# Patient Record
Sex: MALE | URBAN - METROPOLITAN AREA
[De-identification: names, ages, dates, MRNs, and addresses within clinical notes are randomized per-mention and may not be internally consistent; named-entity substitution may affect disease eponyms.]

---

## 2021-02-08 ENCOUNTER — OFFICE (OUTPATIENT)
Dept: URBAN - METROPOLITAN AREA CLINIC 75 | Facility: CLINIC | Age: 76
End: 2021-02-08
Payer: OTHER GOVERNMENT

## 2021-02-08 VITALS — TEMPERATURE: 96 F | HEIGHT: 70 IN | WEIGHT: 203 LBS | RESPIRATION RATE: 16 BRPM

## 2021-02-08 DIAGNOSIS — Z86.010 PERSONAL HISTORY OF COLONIC POLYPS: ICD-10-CM

## 2021-02-08 DIAGNOSIS — R05 COUGH: ICD-10-CM

## 2021-02-08 DIAGNOSIS — K30 FUNCTIONAL DYSPEPSIA: ICD-10-CM

## 2021-02-08 DIAGNOSIS — K21.9 GASTRO-ESOPHAGEAL REFLUX DISEASE WITHOUT ESOPHAGITIS: ICD-10-CM

## 2021-02-08 PROCEDURE — 99204 OFFICE O/P NEW MOD 45 MIN: CPT | Performed by: INTERNAL MEDICINE

## 2021-02-08 NOTE — SERVICEHPINOTES
thank you very much for referring   For evaluation.  As you know he is a pleasant 75-year-old gentleman who does have a history of polyps.  I reviewed his records and he had a colonoscopy done in January of last year.  He has no lower GI complaints.  He does not need lower GI workup or testing at this time.He is having continued reflux.  He is having breakthrough symptoms, he'll have cough or sneezing after meals.  He has some dyspeptic symptoms and satiety.  It sounds like he had a gastric emptying study based on his description which was negative.  It also sounds like he had [facility] and is still having reflux in spite of surgery.  He says he eats food like eggs he'll belch them and they come back up and he can taste and hours later.  There is no dysphagia, odynophagia melena or hematemesis.  He denies chest pain.  There is no weight loss.  He is not a smoker or drinker.  He is in no distress.  He does not look acutely ill.

## 2021-02-08 NOTE — SERVICENOTES
records are reviewed.  He had a colonoscopy in January of last year done at [facility].  His last upper endoscopy was in December 2018.  This was prior to his fundoplication. hemoglobin 12.3.  Hepatic function panel within normal limits.

## 2024-11-02 ENCOUNTER — APPOINTMENT (OUTPATIENT)
Dept: MRI IMAGING | Facility: HOSPITAL | Age: 79
End: 2024-11-02
Payer: OTHER GOVERNMENT

## 2024-11-02 ENCOUNTER — HOSPITAL ENCOUNTER (OUTPATIENT)
Facility: HOSPITAL | Age: 79
Setting detail: OBSERVATION
Discharge: HOME | End: 2024-11-03
Attending: EMERGENCY MEDICINE | Admitting: EMERGENCY MEDICINE
Payer: OTHER GOVERNMENT

## 2024-11-02 ENCOUNTER — APPOINTMENT (OUTPATIENT)
Dept: CT IMAGING | Facility: HOSPITAL | Age: 79
End: 2024-11-02
Payer: OTHER GOVERNMENT

## 2024-11-02 DIAGNOSIS — E11.65 HYPERGLYCEMIA DUE TO DIABETES MELLITUS: ICD-10-CM

## 2024-11-02 DIAGNOSIS — R94.31 PROLONGED Q-T INTERVAL ON ECG: ICD-10-CM

## 2024-11-02 DIAGNOSIS — R42 VERTIGO: Primary | ICD-10-CM

## 2024-11-02 LAB
ALBUMIN SERPL-MCNC: 3.7 G/DL (ref 3.5–5.2)
ALBUMIN/GLOB SERPL: 1.4 G/DL
ALP SERPL-CCNC: 86 U/L (ref 39–117)
ALT SERPL W P-5'-P-CCNC: 9 U/L (ref 1–41)
ANION GAP SERPL CALCULATED.3IONS-SCNC: 9 MMOL/L (ref 5–15)
APTT PPP: 25.6 SECONDS (ref 22.7–35.4)
AST SERPL-CCNC: 8 U/L (ref 1–40)
BASOPHILS # BLD AUTO: 0.07 10*3/MM3 (ref 0–0.2)
BASOPHILS NFR BLD AUTO: 0.9 % (ref 0–1.5)
BILIRUB SERPL-MCNC: 0.5 MG/DL (ref 0–1.2)
BUN SERPL-MCNC: 9 MG/DL (ref 8–23)
BUN/CREAT SERPL: 7.5 (ref 7–25)
CALCIUM SPEC-SCNC: 9.1 MG/DL (ref 8.6–10.5)
CHLORIDE SERPL-SCNC: 98 MMOL/L (ref 98–107)
CO2 SERPL-SCNC: 27 MMOL/L (ref 22–29)
CREAT SERPL-MCNC: 1.2 MG/DL (ref 0.76–1.27)
DEPRECATED RDW RBC AUTO: 39.4 FL (ref 37–54)
EGFRCR SERPLBLD CKD-EPI 2021: 62.3 ML/MIN/1.73
EOSINOPHIL # BLD AUTO: 0.41 10*3/MM3 (ref 0–0.4)
EOSINOPHIL NFR BLD AUTO: 5.2 % (ref 0.3–6.2)
ERYTHROCYTE [DISTWIDTH] IN BLOOD BY AUTOMATED COUNT: 12.7 % (ref 12.3–15.4)
GLOBULIN UR ELPH-MCNC: 2.7 GM/DL
GLUCOSE BLDC GLUCOMTR-MCNC: 183 MG/DL (ref 70–130)
GLUCOSE BLDC GLUCOMTR-MCNC: 214 MG/DL (ref 70–130)
GLUCOSE BLDC GLUCOMTR-MCNC: 367 MG/DL (ref 70–130)
GLUCOSE SERPL-MCNC: 412 MG/DL (ref 65–99)
HBA1C MFR BLD: 10.6 % (ref 4.8–5.6)
HCT VFR BLD AUTO: 43.5 % (ref 37.5–51)
HGB BLD-MCNC: 15.2 G/DL (ref 13–17.7)
IMM GRANULOCYTES # BLD AUTO: 0.02 10*3/MM3 (ref 0–0.05)
IMM GRANULOCYTES NFR BLD AUTO: 0.3 % (ref 0–0.5)
INR PPP: 0.99 (ref 0.9–1.1)
LYMPHOCYTES # BLD AUTO: 1.23 10*3/MM3 (ref 0.7–3.1)
LYMPHOCYTES NFR BLD AUTO: 15.7 % (ref 19.6–45.3)
MAGNESIUM SERPL-MCNC: 2.1 MG/DL (ref 1.6–2.4)
MCH RBC QN AUTO: 30.3 PG (ref 26.6–33)
MCHC RBC AUTO-ENTMCNC: 34.9 G/DL (ref 31.5–35.7)
MCV RBC AUTO: 86.7 FL (ref 79–97)
MONOCYTES # BLD AUTO: 0.51 10*3/MM3 (ref 0.1–0.9)
MONOCYTES NFR BLD AUTO: 6.5 % (ref 5–12)
NEUTROPHILS NFR BLD AUTO: 5.58 10*3/MM3 (ref 1.7–7)
NEUTROPHILS NFR BLD AUTO: 71.4 % (ref 42.7–76)
NRBC BLD AUTO-RTO: 0 /100 WBC (ref 0–0.2)
PLATELET # BLD AUTO: 249 10*3/MM3 (ref 140–450)
PMV BLD AUTO: 10.2 FL (ref 6–12)
POTASSIUM SERPL-SCNC: 3.6 MMOL/L (ref 3.5–5.2)
PROT SERPL-MCNC: 6.4 G/DL (ref 6–8.5)
PROTHROMBIN TIME: 13.3 SECONDS (ref 11.7–14.2)
RBC # BLD AUTO: 5.02 10*6/MM3 (ref 4.14–5.8)
SODIUM SERPL-SCNC: 134 MMOL/L (ref 136–145)
TROPONIN T SERPL HS-MCNC: 27 NG/L
WBC NRBC COR # BLD AUTO: 7.82 10*3/MM3 (ref 3.4–10.8)

## 2024-11-02 PROCEDURE — 63710000001 INSULIN REGULAR HUMAN PER 5 UNITS: Performed by: EMERGENCY MEDICINE

## 2024-11-02 PROCEDURE — G0378 HOSPITAL OBSERVATION PER HR: HCPCS

## 2024-11-02 PROCEDURE — 83735 ASSAY OF MAGNESIUM: CPT | Performed by: EMERGENCY MEDICINE

## 2024-11-02 PROCEDURE — 84484 ASSAY OF TROPONIN QUANT: CPT | Performed by: EMERGENCY MEDICINE

## 2024-11-02 PROCEDURE — 93005 ELECTROCARDIOGRAM TRACING: CPT | Performed by: EMERGENCY MEDICINE

## 2024-11-02 PROCEDURE — 85610 PROTHROMBIN TIME: CPT | Performed by: EMERGENCY MEDICINE

## 2024-11-02 PROCEDURE — 85730 THROMBOPLASTIN TIME PARTIAL: CPT | Performed by: EMERGENCY MEDICINE

## 2024-11-02 PROCEDURE — 70496 CT ANGIOGRAPHY HEAD: CPT

## 2024-11-02 PROCEDURE — 25510000001 IOPAMIDOL PER 1 ML: Performed by: EMERGENCY MEDICINE

## 2024-11-02 PROCEDURE — 99285 EMERGENCY DEPT VISIT HI MDM: CPT

## 2024-11-02 PROCEDURE — 82948 REAGENT STRIP/BLOOD GLUCOSE: CPT

## 2024-11-02 PROCEDURE — 83036 HEMOGLOBIN GLYCOSYLATED A1C: CPT

## 2024-11-02 PROCEDURE — 70498 CT ANGIOGRAPHY NECK: CPT

## 2024-11-02 PROCEDURE — 80053 COMPREHEN METABOLIC PANEL: CPT | Performed by: EMERGENCY MEDICINE

## 2024-11-02 PROCEDURE — 85025 COMPLETE CBC W/AUTO DIFF WBC: CPT | Performed by: EMERGENCY MEDICINE

## 2024-11-02 PROCEDURE — 63710000001 INSULIN LISPRO (HUMAN) PER 5 UNITS

## 2024-11-02 RX ORDER — MECLIZINE HYDROCHLORIDE 25 MG/1
25 TABLET ORAL 3 TIMES DAILY PRN
Status: DISCONTINUED | OUTPATIENT
Start: 2024-11-02 | End: 2024-11-03 | Stop reason: HOSPADM

## 2024-11-02 RX ORDER — SODIUM CHLORIDE 9 MG/ML
40 INJECTION, SOLUTION INTRAVENOUS AS NEEDED
Status: DISCONTINUED | OUTPATIENT
Start: 2024-11-02 | End: 2024-11-03 | Stop reason: HOSPADM

## 2024-11-02 RX ORDER — HYDROCHLOROTHIAZIDE 25 MG/1
25 TABLET ORAL 2 TIMES DAILY
COMMUNITY

## 2024-11-02 RX ORDER — GABAPENTIN 800 MG/1
800 TABLET ORAL 3 TIMES DAILY
COMMUNITY

## 2024-11-02 RX ORDER — IOPAMIDOL 755 MG/ML
95 INJECTION, SOLUTION INTRAVASCULAR
Status: COMPLETED | OUTPATIENT
Start: 2024-11-02 | End: 2024-11-02

## 2024-11-02 RX ORDER — ONDANSETRON 4 MG/1
4 TABLET, ORALLY DISINTEGRATING ORAL EVERY 6 HOURS PRN
Status: DISCONTINUED | OUTPATIENT
Start: 2024-11-02 | End: 2024-11-03 | Stop reason: HOSPADM

## 2024-11-02 RX ORDER — POTASSIUM CHLORIDE 1.5 G/1.58G
40 POWDER, FOR SOLUTION ORAL DAILY
Status: DISCONTINUED | OUTPATIENT
Start: 2024-11-02 | End: 2024-11-03 | Stop reason: HOSPADM

## 2024-11-02 RX ORDER — EMTRICITABINE AND TENOFOVIR ALAFENAMIDE 200; 25 MG/1; MG/1
TABLET ORAL DAILY
COMMUNITY

## 2024-11-02 RX ORDER — METOPROLOL TARTRATE 50 MG
50 TABLET ORAL 2 TIMES DAILY
COMMUNITY

## 2024-11-02 RX ORDER — CLOBETASOL PROPIONATE 0.5 MG/ML
1 LOTION TOPICAL 2 TIMES DAILY
COMMUNITY

## 2024-11-02 RX ORDER — ONDANSETRON 2 MG/ML
4 INJECTION INTRAMUSCULAR; INTRAVENOUS EVERY 6 HOURS PRN
Status: DISCONTINUED | OUTPATIENT
Start: 2024-11-02 | End: 2024-11-03 | Stop reason: HOSPADM

## 2024-11-02 RX ORDER — FLUTICASONE PROPIONATE 50 MCG
2 SPRAY, SUSPENSION (ML) NASAL DAILY
COMMUNITY

## 2024-11-02 RX ORDER — HYDRALAZINE HYDROCHLORIDE 10 MG/1
25 TABLET, FILM COATED ORAL 2 TIMES DAILY
Status: DISCONTINUED | OUTPATIENT
Start: 2024-11-02 | End: 2024-11-03 | Stop reason: HOSPADM

## 2024-11-02 RX ORDER — AMOXICILLIN 250 MG
2 CAPSULE ORAL 2 TIMES DAILY PRN
Status: DISCONTINUED | OUTPATIENT
Start: 2024-11-02 | End: 2024-11-03 | Stop reason: HOSPADM

## 2024-11-02 RX ORDER — NICOTINE POLACRILEX 4 MG
15 LOZENGE BUCCAL
Status: DISCONTINUED | OUTPATIENT
Start: 2024-11-02 | End: 2024-11-03 | Stop reason: HOSPADM

## 2024-11-02 RX ORDER — DEXTROSE MONOHYDRATE 25 G/50ML
25 INJECTION, SOLUTION INTRAVENOUS
Status: DISCONTINUED | OUTPATIENT
Start: 2024-11-02 | End: 2024-11-03 | Stop reason: HOSPADM

## 2024-11-02 RX ORDER — IBUPROFEN 600 MG/1
1 TABLET ORAL
Status: DISCONTINUED | OUTPATIENT
Start: 2024-11-02 | End: 2024-11-03 | Stop reason: HOSPADM

## 2024-11-02 RX ORDER — POTASSIUM CHLORIDE 750 MG/1
40 TABLET, FILM COATED, EXTENDED RELEASE ORAL EVERY 4 HOURS
Status: DISPENSED | OUTPATIENT
Start: 2024-11-02 | End: 2024-11-03

## 2024-11-02 RX ORDER — GABAPENTIN 400 MG/1
800 CAPSULE ORAL EVERY 8 HOURS SCHEDULED
Status: DISCONTINUED | OUTPATIENT
Start: 2024-11-02 | End: 2024-11-03 | Stop reason: HOSPADM

## 2024-11-02 RX ORDER — ACETAMINOPHEN 325 MG/1
650 TABLET ORAL EVERY 4 HOURS PRN
Status: DISCONTINUED | OUTPATIENT
Start: 2024-11-02 | End: 2024-11-03 | Stop reason: HOSPADM

## 2024-11-02 RX ORDER — BISACODYL 10 MG
10 SUPPOSITORY, RECTAL RECTAL DAILY PRN
Status: DISCONTINUED | OUTPATIENT
Start: 2024-11-02 | End: 2024-11-03 | Stop reason: HOSPADM

## 2024-11-02 RX ORDER — HYDROCHLOROTHIAZIDE 25 MG/1
25 TABLET ORAL 2 TIMES DAILY
Status: DISCONTINUED | OUTPATIENT
Start: 2024-11-02 | End: 2024-11-03 | Stop reason: HOSPADM

## 2024-11-02 RX ORDER — MECLIZINE HYDROCHLORIDE 25 MG/1
25 TABLET ORAL 3 TIMES DAILY PRN
COMMUNITY

## 2024-11-02 RX ORDER — ACETAMINOPHEN 650 MG/1
650 SUPPOSITORY RECTAL EVERY 4 HOURS PRN
Status: DISCONTINUED | OUTPATIENT
Start: 2024-11-02 | End: 2024-11-03 | Stop reason: HOSPADM

## 2024-11-02 RX ORDER — POTASSIUM CHLORIDE 3 G/15ML
40 SOLUTION ORAL 2 TIMES DAILY
COMMUNITY

## 2024-11-02 RX ORDER — INSULIN LISPRO 100 [IU]/ML
2-7 INJECTION, SOLUTION INTRAVENOUS; SUBCUTANEOUS
Status: DISCONTINUED | OUTPATIENT
Start: 2024-11-02 | End: 2024-11-03 | Stop reason: HOSPADM

## 2024-11-02 RX ORDER — HYDRALAZINE HYDROCHLORIDE 25 MG/1
25 TABLET, FILM COATED ORAL 2 TIMES DAILY
COMMUNITY

## 2024-11-02 RX ORDER — POLYETHYLENE GLYCOL 3350 17 G/17G
17 POWDER, FOR SOLUTION ORAL DAILY PRN
Status: DISCONTINUED | OUTPATIENT
Start: 2024-11-02 | End: 2024-11-03 | Stop reason: HOSPADM

## 2024-11-02 RX ORDER — METOPROLOL TARTRATE 50 MG
50 TABLET ORAL 2 TIMES DAILY
Status: DISCONTINUED | OUTPATIENT
Start: 2024-11-02 | End: 2024-11-03 | Stop reason: HOSPADM

## 2024-11-02 RX ORDER — SODIUM CHLORIDE 0.9 % (FLUSH) 0.9 %
10 SYRINGE (ML) INJECTION EVERY 12 HOURS SCHEDULED
Status: DISCONTINUED | OUTPATIENT
Start: 2024-11-02 | End: 2024-11-03 | Stop reason: HOSPADM

## 2024-11-02 RX ORDER — SODIUM CHLORIDE 0.9 % (FLUSH) 0.9 %
10 SYRINGE (ML) INJECTION AS NEEDED
Status: DISCONTINUED | OUTPATIENT
Start: 2024-11-02 | End: 2024-11-03 | Stop reason: HOSPADM

## 2024-11-02 RX ORDER — ACETAMINOPHEN 160 MG/5ML
650 SOLUTION ORAL EVERY 4 HOURS PRN
Status: DISCONTINUED | OUTPATIENT
Start: 2024-11-02 | End: 2024-11-03 | Stop reason: HOSPADM

## 2024-11-02 RX ORDER — NITROGLYCERIN 0.4 MG/1
0.4 TABLET SUBLINGUAL
Status: DISCONTINUED | OUTPATIENT
Start: 2024-11-02 | End: 2024-11-03 | Stop reason: HOSPADM

## 2024-11-02 RX ORDER — BISACODYL 5 MG/1
5 TABLET, DELAYED RELEASE ORAL DAILY PRN
Status: DISCONTINUED | OUTPATIENT
Start: 2024-11-02 | End: 2024-11-03 | Stop reason: HOSPADM

## 2024-11-02 RX ADMIN — GABAPENTIN 800 MG: 400 CAPSULE ORAL at 21:45

## 2024-11-02 RX ADMIN — Medication 10 ML: at 21:57

## 2024-11-02 RX ADMIN — INSULIN HUMAN 7 UNITS: 100 INJECTION, SOLUTION PARENTERAL at 18:22

## 2024-11-02 RX ADMIN — IOPAMIDOL 95 ML: 755 INJECTION, SOLUTION INTRAVENOUS at 18:59

## 2024-11-02 RX ADMIN — INSULIN LISPRO 2 UNITS: 100 INJECTION, SOLUTION INTRAVENOUS; SUBCUTANEOUS at 21:43

## 2024-11-02 RX ADMIN — HYDRALAZINE HYDROCHLORIDE 25 MG: 10 TABLET ORAL at 21:43

## 2024-11-02 RX ADMIN — HYDROCHLOROTHIAZIDE 25 MG: 25 TABLET ORAL at 21:42

## 2024-11-02 RX ADMIN — POTASSIUM CHLORIDE 40 MEQ: 1.5 POWDER, FOR SOLUTION ORAL at 21:42

## 2024-11-02 NOTE — ED NOTES
Patient arrives via [facility] from home for complaints of balance issues since last night. Negative stroke assessment. No headaches or visual disturbances. Patient has a history of vertigo. No nausea or vomiting. Alert and oriented x4.

## 2024-11-02 NOTE — PROGRESS NOTES
Clinical Pharmacy Services: Medication History    Nakul Dutton is a 79 y.o. male presenting to [facility] for No admission diagnoses are documented for this encounter.    He  has no past medical history on file.    Allergies as of 11/02/2024 - Reviewed 11/02/2024   Allergen Reaction Noted    Erythromycin Rash 11/02/2024       Medication information was obtained from: [facility]  Pharmacy and Phone Number:     Prior to Admission Medications       Prescriptions Last Dose Informant Patient Reported? Taking?    clobetasol (CLOBEX) 0.05 % lotion  Pharmacy Yes Yes    Apply 1 Application topically to the appropriate area as directed 2 (Two) Times a Day.    Diclofenac Sodium (VOLTAREN) 1 % gel gel  Pharmacy Yes Yes    Apply 4 g topically to the appropriate area as directed 4 (Four) Times a Day.    Emtricitabine-Tenofovir AF (Descovy) 200-25 MG per tablet  Pharmacy Yes Yes    Take  by mouth Daily.    fluticasone (FLONASE) 50 MCG/ACT nasal spray  Pharmacy Yes Yes    Administer 2 sprays into the nostril(s) as directed by provider Daily.    gabapentin (NEURONTIN) 800 MG tablet  Pharmacy Yes Yes    Take 1 tablet by mouth 3 (Three) Times a Day.    hydrALAZINE (APRESOLINE) 25 MG tablet  Pharmacy Yes Yes    Take 1 tablet by mouth 2 (Two) Times a Day.    hydroCHLOROthiazide 25 MG tablet  Pharmacy Yes Yes    Take 1 tablet by mouth 2 (Two) Times a Day.    insulin glargine (LANTUS, SEMGLEE) 100 UNIT/ML injection  Pharmacy Yes Yes    Inject 57 Units under the skin into the appropriate area as directed Every Morning.    meclizine (ANTIVERT) 25 MG tablet  Pharmacy Yes Yes    Take 1 tablet by mouth 3 (Three) Times a Day As Needed for Dizziness.    metoprolol tartrate (LOPRESSOR) 50 MG tablet  Pharmacy Yes Yes    Take 1 tablet by mouth 2 (Two) Times a Day.    Potassium Chloride 40 MEQ/15ML (20%) solution  Pharmacy Yes Yes    Take 15 mL by mouth 2 (Two) Times a Day. Last refill Nov 2023 but still active script per  [facility]              Medication notes: [facility] at [facility] for medication list. She reports pt has not filled potassium chloride since 11/2023 but reports the script is still active.     This medication list is complete to the best of my knowledge as of 11/2/2024    Please call if questions.    Neymar Beasley  Pharmacy Intern  Phone [phone]  11/2/2024 19:09 EDT

## 2024-11-02 NOTE — ED NOTES
Nursing report ED to floor  [facility]  79 y.o.  male    HPI :  HPI  Stated Reason for Visit: vertigo, balance issues  History Obtained From: EMS    Chief Complaint  Chief Complaint   Patient presents with    Dizziness       Admitting doctor:   Markel Goff MD    Admitting diagnosis:   The primary encounter diagnosis was Vertigo. Diagnoses of Hyperglycemia due to diabetes mellitus and Prolonged Q-T interval on ECG were also pertinent to this visit.    Code status:   Current Code Status       Date Active Code Status Order ID Comments User Context       11/2/2024 1947 CPR (Attempt to Resuscitate) [id number]  [place] Ramachandran, JAILYN ED        Question Answer    Code Status (Patient has no pulse and is not breathing) CPR (Attempt to Resuscitate)    Medical Interventions (Patient has pulse or is breathing) Full Support                    Allergies:   Erythromycin    Isolation:   No active isolations    Intake and Output  No intake or output data in the 24 hours ending 11/02/24 1954    Weight:       11/02/24  1713   Weight: 84.8 kg (187 lb)       Most recent vitals:   Vitals:    11/02/24 1907 11/02/24 1908 11/02/24 1909 11/02/24 1931   BP:  148/99  151/88   Pulse: (!) 38 (!) 44 60 57   Resp:       Temp:       SpO2: 100% 100% 94% 98%   Weight:       Height:           Active LDAs/IV Access:   Lines, Drains & Airways       Active LDAs       Name Placement date Placement time Site Days    Peripheral IV 11/02/24 1732 Right Antecubital 11/02/24  1732  Antecubital  less than 1                    Labs (abnormal labs have a star):   Labs Reviewed   COMPREHENSIVE METABOLIC PANEL - Abnormal; Notable for the following components:       Result Value    Glucose 412 (*)     Sodium 134 (*)     All other components within normal limits    Narrative:     GFR Normal >60  Chronic Kidney Disease <60  Kidney Failure <15    The GFR formula is only valid for adults with stable renal function between ages 18 and 70.   SINGLE HS TROPONIN T -  Abnormal; Notable for the following components:    HS Troponin T 27 (*)     All other components within normal limits    Narrative:     High Sensitive Troponin T Reference Range:  <14.0 ng/L- Negative Female for AMI  <22.0 ng/L- Negative Male for AMI  >=14 - Abnormal Female indicating possible myocardial injury.  >=22 - Abnormal Male indicating possible myocardial injury.   Clinicians would have to utilize clinical acumen, EKG, Troponin, and serial changes to determine if it is an Acute Myocardial Infarction or myocardial injury due to an underlying chronic condition.        CBC WITH AUTO DIFFERENTIAL - Abnormal; Notable for the following components:    Lymphocyte % 15.7 (*)     Eosinophils, Absolute 0.41 (*)     All other components within normal limits   POCT GLUCOSE FINGERSTICK - Abnormal; Notable for the following components:    Glucose 367 (*)     All other components within normal limits   POCT GLUCOSE FINGERSTICK - Abnormal; Notable for the following components:    Glucose 214 (*)     All other components within normal limits   PROTIME-INR - Normal   APTT - Normal   MAGNESIUM - Normal   HEMOGLOBIN A1C   POCT GLUCOSE FINGERSTICK   POCT GLUCOSE FINGERSTICK   POCT GLUCOSE FINGERSTICK   POCT GLUCOSE FINGERSTICK   CBC AND DIFFERENTIAL    Narrative:     The following orders were created for panel order CBC & Differential.  Procedure                               Abnormality         Status                     ---------                               -----------         ------                     CBC Auto Differential[[id number]]        Abnormal            Final result                 Please view results for these tests on the individual orders.       EKG:   ECG 12 Lead Other; Dizziness   Preliminary Result   HEART RATE=58  bpm   RR Fgudjzjn=0080  ms   MI Zpbkjysc=658  ms   P Horizontal Axis=  deg   P Front Axis=-11  deg   QRSD Fzyfysvb=206  ms   QT Txqxcolz=460  ms   GGaV=142  ms   QRS Axis=-59  deg   T Wave Axis=-14   deg   - ABNORMAL ECG -   Sinus rhythm   Probable left atrial enlargement   Right bundle branch block   LVH with IVCD and secondary repol abnrm   Prolonged QT interval   Date and Time of Study:2024-11-02 17:31:39          Meds given in ED:   Medications   sodium chloride 0.9 % flush 10 mL (has no administration in time range)   sodium chloride 0.9 % flush 10 mL (has no administration in time range)   sodium chloride 0.9 % flush 10 mL (has no administration in time range)   sodium chloride 0.9 % infusion 40 mL (has no administration in time range)   ondansetron ODT (ZOFRAN-ODT) disintegrating tablet 4 mg (has no administration in time range)     Or   ondansetron (ZOFRAN) injection 4 mg (has no administration in time range)   nitroglycerin (NITROSTAT) SL tablet 0.4 mg (has no administration in time range)   Potassium Replacement - Follow Nurse / BPA Driven Protocol (has no administration in time range)   Magnesium Standard Dose Replacement - Follow Nurse / BPA Driven Protocol (has no administration in time range)   Phosphorus Replacement - Follow Nurse / BPA Driven Protocol (has no administration in time range)   Calcium Replacement - Follow Nurse / BPA Driven Protocol (has no administration in time range)   acetaminophen (TYLENOL) tablet 650 mg (has no administration in time range)     Or   acetaminophen (TYLENOL) 160 MG/5ML oral solution 650 mg (has no administration in time range)     Or   acetaminophen (TYLENOL) suppository 650 mg (has no administration in time range)   sennosides-docusate (PERICOLACE) 8.6-50 MG per tablet 2 tablet (has no administration in time range)     And   polyethylene glycol (MIRALAX) packet 17 g (has no administration in time range)     And   bisacodyl (DULCOLAX) EC tablet 5 mg (has no administration in time range)     And   bisacodyl (DULCOLAX) suppository 10 mg (has no administration in time range)   dextrose (GLUTOSE) oral gel 15 g (has no administration in time range)   dextrose (D50W)  (25 g/50 mL) IV injection 25 g (has no administration in time range)   glucagon (GLUCAGEN) injection 1 mg (has no administration in time range)   insulin lispro (HUMALOG/ADMELOG) injection 2-7 Units (has no administration in time range)   Emtricitabine-Tenofovir AF (DESCOVY) 200-25 MG per tablet 1 tablet (has no administration in time range)   gabapentin (NEURONTIN) capsule 800 mg (has no administration in time range)   hydrALAZINE (APRESOLINE) tablet 25 mg (has no administration in time range)   hydroCHLOROthiazide tablet 25 mg (has no administration in time range)   insulin glargine (LANTUS, SEMGLEE) injection 57 Units (has no administration in time range)   meclizine (ANTIVERT) tablet 25 mg (has no administration in time range)   metoprolol tartrate (LOPRESSOR) tablet 50 mg (has no administration in time range)   potassium chloride (KLOR-CON) packet 40 mEq (has no administration in time range)   insulin regular (humuLIN R,novoLIN R) injection 7 Units (7 Units Intravenous Given 11/2/24 1822)   iopamidol (ISOVUE-370) 76 % injection 95 mL (95 mL Intravenous Given 11/2/24 1859)       Imaging results:  CT Angiogram Head    Result Date: 11/2/2024   1.  No acute intracranial hemorrhage. Mild small vessel ischemic disease. If there is clinical concern for acute infarction, this would be better assessed with MRI. 2.  Asymmetric beaded appearance of the cervical right internal carotid artery, including a more focal approximately 0.7 cm outpouching along the anteromedial aspect at the level of C2. Only subtle contour abnormality is identified in the cervical left intracarotid artery. Findings suggest possible fibromuscular dysplasia, although a superimposed saccular aneurysm is difficult to exclude.  This report was finalized on 11/2/2024 7:38 PM by Dr. Yvonne Pop M.D on Workstation: [id number]      CT Angiogram Neck    Result Date: 11/2/2024   1.  No acute intracranial hemorrhage. Mild small vessel ischemic disease.  If there is clinical concern for acute infarction, this would be better assessed with MRI. 2.  Asymmetric beaded appearance of the cervical right internal carotid artery, including a more focal approximately 0.7 cm outpouching along the anteromedial aspect at the level of C2. Only subtle contour abnormality is identified in the cervical left intracarotid artery. Findings suggest possible fibromuscular dysplasia, although a superimposed saccular aneurysm is difficult to exclude.  This report was finalized on 11/2/2024 7:38 PM by Dr. Yvonne Pop M.D on Workstation: [id number]       Ambulatory status:   - up to bedside      Social issues:   Social History     Socioeconomic History    Marital status: Single       Peripheral Neurovascular  Peripheral Neurovascular (Adult)  Peripheral Neurovascular WDL: WDL    Neuro Cognitive  Neuro Cognitive (Adult)  Cognitive/Neuro/Behavioral WDL: .WDL except  NIH Stroke Scale  Interval: baseline  1a. Level of Consciousness: 0-->Alert, keenly responsive  1b. LOC Questions: 0-->Answers both questions correctly  1c. LOC Commands: 0-->Performs both tasks correctly  2. Best Gaze: 0-->Normal  3. Visual: 0-->No visual loss  4. Facial Palsy: 0-->Normal symmetrical movements  5a. Motor Arm, Left: 0-->No drift, limb holds 90 (or 45) degrees for full 10 secs  5b. Motor Arm, Right: 0-->No drift, limb holds 90 (or 45) degrees for full 10 secs  6a. Motor Leg, Left: 0-->No drift, leg holds 30 degree position for full 5 secs  6b. Motor Leg, Right: 0-->No drift, leg holds 30 degree position for full 5 secs  7. Limb Ataxia: 0-->Absent  8. Sensory: 0-->Normal, no sensory loss  9. Best Language: 0-->No aphasia, normal  10. Dysarthria: 0-->Normal  11. Extinction and Inattention (formerly Neglect): 0-->No abnormality  Total (NIH Stroke Scale): 0    Learning  Learning Assessment  Learning Readiness and Ability: no barriers identified    Respiratory  Respiratory WDL  Respiratory WDL: WDL    Abdominal  Pain       Pain Assessments  Pain (Adult)  (0-10) Pain Rating: Rest: 0    NIH Stroke Scale  NIH Stroke Scale  Interval: baseline  1a. Level of Consciousness: 0-->Alert, keenly responsive  1b. LOC Questions: 0-->Answers both questions correctly  1c. LOC Commands: 0-->Performs both tasks correctly  2. Best Gaze: 0-->Normal  3. Visual: 0-->No visual loss  4. Facial Palsy: 0-->Normal symmetrical movements  5a. Motor Arm, Left: 0-->No drift, limb holds 90 (or 45) degrees for full 10 secs  5b. Motor Arm, Right: 0-->No drift, limb holds 90 (or 45) degrees for full 10 secs  6a. Motor Leg, Left: 0-->No drift, leg holds 30 degree position for full 5 secs  6b. Motor Leg, Right: 0-->No drift, leg holds 30 degree position for full 5 secs  7. Limb Ataxia: 0-->Absent  8. Sensory: 0-->Normal, no sensory loss  9. Best Language: 0-->No aphasia, normal  10. Dysarthria: 0-->Normal  11. Extinction and Inattention (formerly Neglect): 0-->No abnormality  Total (NIH Stroke Scale): 0    Daisy Lemus RN  11/02/24 19:54 EDT

## 2024-11-02 NOTE — ED PROVIDER NOTES
" EMERGENCY DEPARTMENT ENCOUNTER  Room Number:  11/11  PCP: Provider, No Known  Independent Historians: Patient and EMS      HPI:  Chief Complaint: had concerns including Dizziness.  Leaning to the right and falling to the right    A complete HPI/ROS/PMH/PSH/SH/FH are unobtainable due to: None    Chronic or social conditions impacting patient care (Social Determinants of Health): None      Context: Nakul Dutton is a 79 y.o. male with a medical history of diabetes and vertigo who presents to the ED c/o acute dizziness with unsteadiness causing him to fall to the right.  Patient says that he was \"leaning to the right all day yesterday\" and then \"began falling to the right all day today.\"  He said he was trying to lean over and feed his dogs this morning and he fell to the right.  He has had persistent imbalance of the day to the point he cannot ambulate safely at home.  He denies any headache.  Denies any vision changes.  Denies any chest pain or difficulties breathing.  He denies any nausea or vomiting.  Denies any fevers or flulike symptoms.  [facility] and has been awaiting an outpatient follow-up with neurolog[facility] in the future.  He additionally tells me that he has been out of his diabetes medications for the past 2 weeks      Review of prior external notes (non-ED) -and- Review of prior external test results outside of this encounter: I independently reviewed the neurosurgery progress note from January 13, 2023.  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      FAMILY HISTORY  No family history on file.      SOCIAL HISTORY  Social History     Socioeconomic History    Marital status: Single         ALLERGIES  Erythromycin      REVIEW OF SYSTEMS  Review of Systems  Included in HPI  All systems reviewed and negative except for those discussed in HPI.      PHYSICAL EXAM    I have reviewed the triage vital signs and nursing notes.    ED Triage Vitals [11/02/24 1713]   Temp Heart Rate Resp BP SpO2   97.4 °F (36.3 °C) 63 18 163/88 98 %      Temp src Heart Rate Source Patient Position BP Location FiO2 (%)   -- -- -- -- --       Physical Exam  GENERAL: alert, no acute distress  SKIN: Warm, dry, no rashes  HENT: Normocephalic, atraumatic  EYES: no scleral icterus, normal conjunctivae  CV: regular rhythm, regular rate  RESPIRATORY: normal effort, lungs clear bilaterally  ABDOMEN: soft, nondistended, nontender  MUSCULOSKELETAL: no deformity  NEURO: alert, moves all extremities, follows commands.  No facial droop.  Speech is clear and coherent.  No focal motor deficits at all.  Normal cerebellar signs.      LAB RESULTS  Recent Results (from the past 24 hours)   POC Glucose Once    Collection Time: 11/02/24  5:22 PM    Specimen: Blood   Result Value Ref Range    Glucose 367 (H) 70 - 130 mg/dL   Comprehensive Metabolic Panel    Collection Time: 11/02/24  5:31 PM    Specimen: Blood   Result Value Ref Range    Glucose 412 (C) 65 - 99 mg/dL    BUN 9 8 - 23 mg/dL    Creatinine 1.20 0.76 - 1.27 mg/dL    Sodium 134 (L) 136 - 145 mmol/L    Potassium 3.6 3.5 - 5.2 mmol/L    Chloride 98 98 - 107 mmol/L    CO2 27.0 22.0 - 29.0 mmol/L    Calcium 9.1 8.6 - 10.5 mg/dL    Total Protein 6.4 6.0 - 8.5 g/dL    Albumin 3.7 3.5 - 5.2 g/dL    ALT (SGPT) 9 1 - 41 U/L    AST (SGOT) 8 1 - 40 U/L    Alkaline Phosphatase 86 39 - 117 U/L    Total  Bilirubin 0.5 0.0 - 1.2 mg/dL    Globulin 2.7 gm/dL    A/G Ratio 1.4 g/dL    BUN/Creatinine Ratio 7.5 7.0 - 25.0    Anion Gap 9.0 5.0 - 15.0 mmol/L    eGFR 62.3 >60.0 mL/min/1.73   Protime-INR    Collection Time: 11/02/24  5:31 PM    Specimen: Blood   Result Value Ref Range    Protime 13.3 11.7 - 14.2 Seconds    INR 0.99 0.90 - 1.10   aPTT    Collection Time: 11/02/24  5:31 PM    Specimen: Blood   Result Value Ref Range    PTT 25.6 22.7 - 35.4 seconds   Single High Sensitivity Troponin T    Collection Time: 11/02/24  5:31 PM    Specimen: Blood   Result Value Ref Range    HS Troponin T 27 (H) <22 ng/L   CBC Auto Differential    Collection Time: 11/02/24  5:31 PM    Specimen: Blood   Result Value Ref Range    WBC 7.82 3.40 - 10.80 10*3/mm3    RBC 5.02 4.14 - 5.80 10*6/mm3    Hemoglobin 15.2 13.0 - 17.7 g/dL    Hematocrit 43.5 37.5 - 51.0 %    MCV 86.7 79.0 - 97.0 fL    MCH 30.3 26.6 - 33.0 pg    MCHC 34.9 31.5 - 35.7 g/dL    RDW 12.7 12.3 - 15.4 %    RDW-SD 39.4 37.0 - 54.0 fl    MPV 10.2 6.0 - 12.0 fL    Platelets 249 140 - 450 10*3/mm3    Neutrophil % 71.4 42.7 - 76.0 %    Lymphocyte % 15.7 (L) 19.6 - 45.3 %    Monocyte % 6.5 5.0 - 12.0 %    Eosinophil % 5.2 0.3 - 6.2 %    Basophil % 0.9 0.0 - 1.5 %    Immature Grans % 0.3 0.0 - 0.5 %    Neutrophils, Absolute 5.58 1.70 - 7.00 10*3/mm3    Lymphocytes, Absolute 1.23 0.70 - 3.10 10*3/mm3    Monocytes, Absolute 0.51 0.10 - 0.90 10*3/mm3    Eosinophils, Absolute 0.41 (H) 0.00 - 0.40 10*3/mm3    Basophils, Absolute 0.07 0.00 - 0.20 10*3/mm3    Immature Grans, Absolute 0.02 0.00 - 0.05 10*3/mm3    nRBC 0.0 0.0 - 0.2 /100 WBC   Magnesium    Collection Time: 11/02/24  5:31 PM    Specimen: Blood   Result Value Ref Range    Magnesium 2.1 1.6 - 2.4 mg/dL   ECG 12 Lead Other; Dizziness    Collection Time: 11/02/24  5:31 PM   Result Value Ref Range    QT Interval 547 ms    QTC Interval 538 ms   POC Glucose Once    Collection Time: 11/02/24  7:13 PM    Specimen: Blood   Result  Value Ref Range    Glucose 214 (H) 70 - 130 mg/dL         RADIOLOGY  CT Angiogram Head, CT Angiogram Neck    Result Date: 11/2/2024  PROCEDURE: CT ANGIOGRAM HEAD-, CT ANGIOGRAM NECK-  HISTORY: Dizziness, unsteadiness.  TECHNIQUE: CT images of the brain were obtained without intravenous contrast. Following the administration of intravenous contrast, CT angiography images of the head and neck were obtained. Reformatted and 3-D images were reviewed. Radiation dose reduction techniques were utilized, including automated exposure control and exposure modulation based on body size.  COMPARISON:  None.  FINDINGS:  Non Contrast CT Brain:  There is moderate to advanced global brain volume loss.  No acute intracranial hemorrhage or pathologic extra-axial collection is identified.  There is no midline shift or mass effect.  The basal cisterns are patent. There is mild small vessel ischemic disease. The grey-white matter differentiation is maintained. There is calcific intracranial atherosclerosis. There is trace mucosal thickening of the right maxillary sinus and trace fluid within the right mastoid air cells.  CT Angiography Head:  The right ophthalmic artery appears to arise from the cavernous segment of the right ICA, a developmental variant. The visible internal carotid arteries, middle and anterior cerebral arteries demonstrate otherwise normal contrast-related enhancement.  The anterior communicating artery complex has a normal appearance. No sizable posterior communicating arteries are identified on either side.  The left vertebral artery is dominant. The basilar artery is tortuous. The vertebrobasilar circulation and both posterior cerebral arteries demonstrate otherwise normal contrast-related enhancement. Bilateral PICA origins are visualized.  No hemodynamically significant stenosis is seen.  CT Angiography Neck:  There is a 3 vessel aortic arch.  The origins of the vessels arising from the arch are patent. The  proximal common carotid arteries are slightly tortuous. There is minimal calcific atherosclerosis at the left carotid bulb. There is no significant carotid bulb narrowing by NASCET criteria. There is a beaded appearance of the right internal carotid artery. There is a more focal approximately 0.7 cm outpouching along the anteromedial aspect of the right internal carotid artery at the level of C2. There is only subtle contour abnormality of the left internal carotid artery.  The vertebral arteries arise from the subclavian arteries.  The left vertebral artery is dominant.  There is no evidence of flow-limiting stenosis or occlusion of the vertebral arteries.  There are incompletely assessed thyroid nodules measuring up to 1.1 cm on the left. There is degenerative disc disease.        1.  No acute intracranial hemorrhage. Mild small vessel ischemic disease. If there is clinical concern for acute infarction, this would be better assessed with MRI. 2.  Asymmetric beaded appearance of the cervical right internal carotid artery, including a more focal approximately 0.7 cm outpouching along the anteromedial aspect at the level of C2. Only subtle contour abnormality is identified in the cervical left intracarotid artery. Findings suggest possible fibromuscular dysplasia, although a superimposed saccular aneurysm is difficult to exclude.  This report was finalized on 11/2/2024 7:38 PM by Dr. Yvonne Pop M.D on Workstation: [id number]         MEDICATIONS GIVEN IN ER  Medications   sodium chloride 0.9 % flush 10 mL (has no administration in time range)   sodium chloride 0.9 % flush 10 mL (has no administration in time range)   sodium chloride 0.9 % flush 10 mL (has no administration in time range)   sodium chloride 0.9 % infusion 40 mL (has no administration in time range)   ondansetron ODT (ZOFRAN-ODT) disintegrating tablet 4 mg (has no administration in time range)     Or   ondansetron (ZOFRAN) injection 4 mg (has no  administration in time range)   nitroglycerin (NITROSTAT) SL tablet 0.4 mg (has no administration in time range)   Potassium Replacement - Follow Nurse / BPA Driven Protocol (has no administration in time range)   Magnesium Standard Dose Replacement - Follow Nurse / BPA Driven Protocol (has no administration in time range)   Phosphorus Replacement - Follow Nurse / BPA Driven Protocol (has no administration in time range)   Calcium Replacement - Follow Nurse / BPA Driven Protocol (has no administration in time range)   acetaminophen (TYLENOL) tablet 650 mg (has no administration in time range)     Or   acetaminophen (TYLENOL) 160 MG/5ML oral solution 650 mg (has no administration in time range)     Or   acetaminophen (TYLENOL) suppository 650 mg (has no administration in time range)   sennosides-docusate (PERICOLACE) 8.6-50 MG per tablet 2 tablet (has no administration in time range)     And   polyethylene glycol (MIRALAX) packet 17 g (has no administration in time range)     And   bisacodyl (DULCOLAX) EC tablet 5 mg (has no administration in time range)     And   bisacodyl (DULCOLAX) suppository 10 mg (has no administration in time range)   insulin regular (humuLIN R,novoLIN R) injection 7 Units (7 Units Intravenous Given 11/2/24 1822)   iopamidol (ISOVUE-370) 76 % injection 95 mL (95 mL Intravenous Given 11/2/24 1859)         ORDERS PLACED DURING THIS VISIT:  Orders Placed This Encounter   Procedures    CT Angiogram Head    CT Angiogram Neck    Comprehensive Metabolic Panel    Protime-INR    aPTT    Single High Sensitivity Troponin T    CBC Auto Differential    Magnesium    CBC (No Diff)    Basic Metabolic Panel    Diet: Cardiac, Diabetic; Healthy Heart (2-3 Na+); Consistent Carbohydrate; Fluid Consistency: Thin (IDDSI 0)    Monitor Blood Pressure    Intake & Output    Weigh Patient    Oral Care    Place Sequential Compression Device    Maintain Sequential Compression Device    Maintain IV Access    Telemetry -  Place Orders & Notify Provider of Results When Patient Experiences Acute Chest Pain, Dysrhythmia or Respiratory Distress    May Be Off Telemetry for Tests    Vital Signs    Continuous Pulse Oximetry    Up With Assistance    Neuro Checks    Code Status and Medical Interventions: CPR (Attempt to Resuscitate); Full Support    Inpatient Neurology Consult Stroke    POC Glucose Once    POC Glucose Once    ECG 12 Lead Other; Dizziness    Insert Peripheral IV    Insert Peripheral IV    Initiate ED Observation Status    CBC & Differential         OUTPATIENT MEDICATION MANAGEMENT:  Current Facility-Administered Medications Ordered in Epic   Medication Dose Route Frequency Provider Last Rate Last Admin    acetaminophen (TYLENOL) tablet 650 mg  650 mg Oral Q4H PRN Trinidad Ramachandran APRN        Or    acetaminophen (TYLENOL) 160 MG/5ML oral solution 650 mg  650 mg Oral Q4H PRN Trinidad Ramachandran APRN        Or    acetaminophen (TYLENOL) suppository 650 mg  650 mg Rectal Q4H PRN Trinidad Ramachandran APRN        sennosides-docusate (PERICOLACE) 8.6-50 MG per tablet 2 tablet  2 tablet Oral BID PRN Trinidad Ramachandran APRN        And    polyethylene glycol (MIRALAX) packet 17 g  17 g Oral Daily PRN Trinidad Ramachandran APRN        And    bisacodyl (DULCOLAX) EC tablet 5 mg  5 mg Oral Daily PRN Trinidad Ramachandran APRN        And    bisacodyl (DULCOLAX) suppository 10 mg  10 mg Rectal Daily PRN Trinidad Ramachandran APRN        Calcium Replacement - Follow Nurse / BPA Driven Protocol   Does not apply PRN rTinidad Ramachandran APRN        Magnesium Standard Dose Replacement - Follow Nurse / BPA Driven Protocol   Does not apply PRN Trinidad Ramachandran APRN        nitroglycerin (NITROSTAT) SL tablet 0.4 mg  0.4 mg Sublingual Q5 Min PRN Trinidad Ramachandran APRN        ondansetron ODT (ZOFRAN-ODT) disintegrating tablet 4 mg  4 mg Oral Q6H PRN Trinidad Ramachandran APRN        Or    ondansetron (ZOFRAN) injection 4 mg  4 mg Intravenous Q6H PRN Trinidad Ramachandran APRN        Phosphorus  Replacement - Follow Nurse / BPA Driven Protocol   Does not apply PRN DuarteArceliahy S, APRN        Potassium Replacement - Follow Nurse / BPA Driven Protocol   Does not apply PRN DuarteArceliahy S, APRN        sodium chloride 0.9 % flush 10 mL  10 mL Intravenous PRN Francisco Pandey MD        sodium chloride 0.9 % flush 10 mL  10 mL Intravenous Q12H Duarte, Trinidad S, APRN        sodium chloride 0.9 % flush 10 mL  10 mL Intravenous PRN DuarteArceliahy S, APRN        sodium chloride 0.9 % infusion 40 mL  40 mL Intravenous PRN DuarteArceliahy S, APRN         Current Outpatient Medications Ordered in Epic   Medication Sig Dispense Refill    clobetasol (CLOBEX) 0.05 % lotion Apply 1 Application topically to the appropriate area as directed 2 (Two) Times a Day.      Diclofenac Sodium (VOLTAREN) 1 % gel gel Apply 4 g topically to the appropriate area as directed 4 (Four) Times a Day.      Emtricitabine-Tenofovir AF (Descovy) 200-25 MG per tablet Take  by mouth Daily.      fluticasone (FLONASE) 50 MCG/ACT nasal spray Administer 2 sprays into the nostril(s) as directed by provider Daily.      gabapentin (NEURONTIN) 800 MG tablet Take 1 tablet by mouth 3 (Three) Times a Day.      hydrALAZINE (APRESOLINE) 25 MG tablet Take 1 tablet by mouth 2 (Two) Times a Day.      hydroCHLOROthiazide 25 MG tablet Take 1 tablet by mouth 2 (Two) Times a Day.      insulin glargine (LANTUS, SEMGLEE) 100 UNIT/ML injection Inject 57 Units under the skin into the appropriate area as directed Every Morning.      meclizine (ANTIVERT) 25 MG tablet Take 1 tablet by mouth 3 (Three) Times a Day As Needed for Dizziness.      metoprolol tartrate (LOPRESSOR) 50 MG tablet Take 1 tablet by mouth 2 (Two) Times a Day.      Potassium Chloride 40 MEQ/15ML (20%) solution Take 15 mL by mouth 2 (Two) Times a Day. Last refill Nov 2023 but still active [facility]           PROCEDURES  Procedures        PROGRESS, DATA ANALYSIS, CONSULTS, AND MEDICAL DECISION  MAKING  All labs have been independently interpreted by me.  All radiology studies have been reviewed by me. All EKG's have been independently viewed and interpreted by me.  Discussion below represents my analysis of pertinent findings related to patient's condition, differential diagnosis, treatment plan and final disposition.    Differential diagnosis includes but is not limited to acute stroke, BPPV, dehydration, vestibular neuritis, electrolyte abnormality.    Clinical Scores:            Total (NIH Stroke Scale): 0      ED Course as of 11/02/24 1948   Sat Nov 02, 2024 1739 EKG         EKG time/Interp time: 1731/1733  Rhythm/Rate: Sinus rhythm, 58 bpm  P waves and AL: Present, normal interval  QRS, axis: 151 ms, right bundle branch block, left axis deviation, LVH  ST and T waves: Interpretation is limited because of the bundle branch block but there are no ST segment elevations present.  The QTc is prolonged at 538 ms.  Independently interpreted by me contemporaneously with treatment   [MICHAEL]   1855 Glucose(!!): 412 [MICHAEL]   1855 HS Troponin T(!): 27 [MICHAEL]   1855 Hemoglobin: 15.2 [MICHAEL]   1855 Hematocrit: 43.5 [MICHAEL]   1923 Glucose(!): 214  Glucose level is improved after 1 dose of insulin. [MICHAEL]   1942 I independently interpreted the Head CT w/o Contrast and my findings are: No acute hemorrhage, no midline shift   [MICHAEL]   1945 I discussed with APRNTrinidad, in the observation unit about the patient.  She agrees to accept him for further medical management tonight on behalf of Dr. Goff. [MICHAEL]      ED Course User Index  [MICHAEL] Francisco Pandey MD             AS OF 19:48 EDT VITALS:    BP - 151/88  HR - 57  TEMP - 97.4 °F (36.3 °C)  O2 SATS - 98%    COMPLEXITY OF CARE  The patient requires admission.      DIAGNOSIS  Final diagnoses:   Vertigo   Hyperglycemia due to diabetes mellitus   Prolonged Q-T interval on ECG         DISPOSITION  ED Disposition       ED Disposition   Intended Admit    Condition   --    Comment   --                 Please note that portions of this document were completed with a voice recognition program.    Note Disclaimer: At [facility], we believe that sharing information builds trust and better relationships. You are receiving this note because you recently visited [facility]. It is possible you will see health information before a provider has talked with you about it. This kind of information can be easy to misunderstand. To help you fully understand what it means for your health, we urge you to discuss this note with your provider.         Francisco Pandey MD  11/02/24 1948

## 2024-11-02 NOTE — H&P
"[facility] [place]    Patient Name: Nakul Dutton  : 1945  MRN: [id number]  Primary Care Physician:  Provider, No Known  Date of admission: 2024    Subjective   Subjective     Chief Complaint:   Chief Complaint   Patient presents with   • Dizziness         HPI:    Nakul Dutton is a 79 y.o. male with a past medical history including, but not limited to, hypertension, type 2 diabetes, and HIV, presented to the emergency department with a complaint of a fall secondary to being off balance.  Patient states that for greater than a year now he has a tendency to lean to the right when he walks.  He calls this vertigo however, he denies any dizziness.  Today he was standing in his kitchen cooking when he leaned to the left and fell.  He denies hitting his head.  He states that his\" vertigo\" has been really bad today and therefore he is not able to walk straight.  He denies any visual disturbances, difficulty with speech, numbness, paresthesias, or weakness of his extremities.  Neurology has been consulted to see the patient in the AM.  PT and OT have also been consulted.    Review of Systems   All systems were reviewed and negative except for: What was mentioned above in the HPI.    Personal History     No past medical history on file.    No past surgical history on file.    Family History: family history is not on file. Otherwise pertinent FHx was reviewed and not pertinent to current issue.    Social History:      Home Medications:  Diclofenac Sodium, Emtricitabine-Tenofovir AF, Potassium Chloride, clobetasol, fluticasone, gabapentin, hydrALAZINE, hydroCHLOROthiazide, insulin glargine, meclizine, and metoprolol tartrate    Allergies:  Allergies   Allergen Reactions   • Erythromycin Rash       Objective   Objective     Vitals:   Temp:  [97.4 °F (36.3 °C)] 97.4 °F (36.3 °C)  Heart Rate:  [37-63] 57  Resp:  [18] 18  BP: (148-163)/(88-99) 151/88  Physical Exam   Constitutional: Awake, alert "   Eyes: PERRLA   HENT: NCAT, mucous membranes moist   Neck: Supple   Respiratory: Clear to auscultation bilaterally, nonlabored respirations    Cardiovascular: regular rate, palpable pedal pulses bilaterally   Gastrointestinal: Positive bowel sounds, soft, nontender, nondistended   Musculoskeletal: No bilateral ankle edema   Psychiatric: Appropriate affect, cooperative   Neurologic: Oriented x 3, speech clear   Skin: No rashes       Result Review    Result Review:  I have personally reviewed the results from the time of this admission to 11/2/2024 19:47 EDT and agree with these findings:  [x]  Laboratory list / accordion  []  Microbiology  [x]  Radiology  [x]  EKG/Telemetry   []  Cardiology/Vascular   []  Pathology  [x]  Old records  []  Other:    Initial workup in the emergency department shows high-sensitivity troponin of 27, sodium 134, glucose 1/4/2012, all of the lab work is at baseline for the patient.  EKG shows sinus rhythm, rate of 58.  CT head without contrast shows no acute intercranial hemorrhage.  Mild small vessel ischemic disease.  CTA head and neck shows asymmetric beaded appearance of the cervical right internal carotid artery, including a more focal approximately 0.7 cm outpouching along that anterior medial aspect of the level of C2.  Only subtle contour abnormality is identified in the cervical left intracarotid artery.  Findings suggest possible fibromuscular dysplasia, although a superimposed saccular aneurysm is difficult to exclude.    Assessment & Plan   Assessment / Plan     Brief Patient Summary:  Nakul Dutton is a 79 y.o. male who was admitted to the observation unit for further evaluation and treatment of his dizziness and disequilibrium.    Active Hospital Problems:  Active Hospital Problems    Diagnosis    • **Dizziness      Plan:     Vertigo/disequilibrium  -Neurochecks every 4 hours   -Vital signs every 4 hours   -Cardiac monitoring   -MRI--pending  -CT Head -no acute intracranial  [Dear  ___] : Dear  [unfilled], [Consult Letter:] : I had the pleasure of evaluating your patient, [unfilled]. hemorrhage.  Mild small vessel ischemic disease.  -CTA Head/neck -asymmetric beaded appearance to the cervical right internal carotid artery, including a more focal approximately 0.7 Salemme Demeter outpouching along the anterior medial aspect at the level of C2.  Only subtle contour abnormality is identified in the cervical left internal carotid artery.  Findings suggest a possible fibromuscular dysplasia, although a superimposed saccular aneurysm is difficult to exclude.  -Neuro consult     Diabetes  -Accu-Cheks AC&HS  -Adult subcutaneous insulin management-low dose  -Hemoglobin A1c- pending    Hypertension  -Monitor blood pressure  -Continue home blood pressure agents    HIV  -Continue home dose Emtricitabine-Tenofovir AF     VTE Prophylaxis:  Mechanical VTE prophylaxis orders are present.        CODE STATUS:    Code Status (Patient has no pulse and is not breathing): CPR (Attempt to Resuscitate)  Medical Interventions (Patient has pulse or is breathing): Full Support    Admission Status:  I believe this patient meets observation status.    76 minutes have been spent by [facility] providers in the care of this patient while under observation status.      Appropriate PPE worn during patient encounter.  Hand hygeine performed before and after seeing the patient.      Electronically signed by JAILYN Sotelo, 11/02/24, 7:47 PM EDT.            [Please see my note below.] : Please see my note below. [Consult Closing:] : Thank you very much for allowing me to participate in the care of this patient.  If you have any questions, please do not hesitate to contact me. [Sincerely,] : Sincerely, [FreeTextEntry2] : Anant Dodd MD\par [address]\[place] [address] [FreeTextEntry3] : Tito Blanton MD\par Director, [facility]\par [facility]\par [address]\[place] [address]\par [phone]

## 2024-11-03 ENCOUNTER — APPOINTMENT (OUTPATIENT)
Dept: MRI IMAGING | Facility: HOSPITAL | Age: 79
End: 2024-11-03
Payer: OTHER GOVERNMENT

## 2024-11-03 VITALS
WEIGHT: 167.4 LBS | SYSTOLIC BLOOD PRESSURE: 154 MMHG | OXYGEN SATURATION: 99 % | RESPIRATION RATE: 16 BRPM | TEMPERATURE: 97.6 F | HEIGHT: 70 IN | DIASTOLIC BLOOD PRESSURE: 82 MMHG | BODY MASS INDEX: 23.96 KG/M2 | HEART RATE: 63 BPM

## 2024-11-03 PROBLEM — R27.8 SENSORY ATAXIA: Status: ACTIVE | Noted: 2024-11-03

## 2024-11-03 PROBLEM — I77.3 FIBROMUSCULAR DYSPLASIA OF CAROTID ARTERY: Status: ACTIVE | Noted: 2024-11-03

## 2024-11-03 LAB
ANION GAP SERPL CALCULATED.3IONS-SCNC: 11.8 MMOL/L (ref 5–15)
BUN SERPL-MCNC: 8 MG/DL (ref 8–23)
BUN/CREAT SERPL: 7.3 (ref 7–25)
CALCIUM SPEC-SCNC: 9.5 MG/DL (ref 8.6–10.5)
CHLORIDE SERPL-SCNC: 100 MMOL/L (ref 98–107)
CO2 SERPL-SCNC: 24.2 MMOL/L (ref 22–29)
CREAT SERPL-MCNC: 1.1 MG/DL (ref 0.76–1.27)
DEPRECATED RDW RBC AUTO: 41.1 FL (ref 37–54)
EGFRCR SERPLBLD CKD-EPI 2021: 69.1 ML/MIN/1.73
ERYTHROCYTE [DISTWIDTH] IN BLOOD BY AUTOMATED COUNT: 13.1 % (ref 12.3–15.4)
GLUCOSE BLDC GLUCOMTR-MCNC: 218 MG/DL (ref 70–130)
GLUCOSE BLDC GLUCOMTR-MCNC: 290 MG/DL (ref 70–130)
GLUCOSE SERPL-MCNC: 204 MG/DL (ref 65–99)
HCT VFR BLD AUTO: 43.8 % (ref 37.5–51)
HGB BLD-MCNC: 15.3 G/DL (ref 13–17.7)
MCH RBC QN AUTO: 30.5 PG (ref 26.6–33)
MCHC RBC AUTO-ENTMCNC: 34.9 G/DL (ref 31.5–35.7)
MCV RBC AUTO: 87.3 FL (ref 79–97)
PLATELET # BLD AUTO: 254 10*3/MM3 (ref 140–450)
PMV BLD AUTO: 10.6 FL (ref 6–12)
POTASSIUM SERPL-SCNC: 3.3 MMOL/L (ref 3.5–5.2)
QT INTERVAL: 547 MS
QTC INTERVAL: 538 MS
RBC # BLD AUTO: 5.02 10*6/MM3 (ref 4.14–5.8)
SODIUM SERPL-SCNC: 136 MMOL/L (ref 136–145)
WBC NRBC COR # BLD AUTO: 8.05 10*3/MM3 (ref 3.4–10.8)

## 2024-11-03 PROCEDURE — 82948 REAGENT STRIP/BLOOD GLUCOSE: CPT

## 2024-11-03 PROCEDURE — G0378 HOSPITAL OBSERVATION PER HR: HCPCS

## 2024-11-03 PROCEDURE — 97530 THERAPEUTIC ACTIVITIES: CPT

## 2024-11-03 PROCEDURE — 85027 COMPLETE CBC AUTOMATED: CPT

## 2024-11-03 PROCEDURE — 80048 BASIC METABOLIC PNL TOTAL CA: CPT

## 2024-11-03 PROCEDURE — 70551 MRI BRAIN STEM W/O DYE: CPT

## 2024-11-03 PROCEDURE — 97162 PT EVAL MOD COMPLEX 30 MIN: CPT

## 2024-11-03 PROCEDURE — 99204 OFFICE O/P NEW MOD 45 MIN: CPT | Performed by: PSYCHIATRY & NEUROLOGY

## 2024-11-03 PROCEDURE — 63710000001 INSULIN LISPRO (HUMAN) PER 5 UNITS

## 2024-11-03 PROCEDURE — 63710000001 INSULIN GLARGINE PER 5 UNITS

## 2024-11-03 RX ORDER — ASPIRIN 81 MG/1
81 TABLET ORAL DAILY
Qty: 90 TABLET | Refills: 0 | Status: SHIPPED | OUTPATIENT
Start: 2024-11-03

## 2024-11-03 RX ADMIN — GABAPENTIN 800 MG: 400 CAPSULE ORAL at 06:24

## 2024-11-03 RX ADMIN — GABAPENTIN 800 MG: 400 CAPSULE ORAL at 13:17

## 2024-11-03 RX ADMIN — HYDRALAZINE HYDROCHLORIDE 25 MG: 10 TABLET ORAL at 09:11

## 2024-11-03 RX ADMIN — INSULIN LISPRO 4 UNITS: 100 INJECTION, SOLUTION INTRAVENOUS; SUBCUTANEOUS at 13:17

## 2024-11-03 RX ADMIN — POTASSIUM CHLORIDE 40 MEQ: 1.5 POWDER, FOR SOLUTION ORAL at 09:11

## 2024-11-03 RX ADMIN — METOPROLOL TARTRATE 50 MG: 50 TABLET, FILM COATED ORAL at 09:11

## 2024-11-03 RX ADMIN — INSULIN GLARGINE 57 UNITS: 100 INJECTION, SOLUTION SUBCUTANEOUS at 09:11

## 2024-11-03 RX ADMIN — Medication 10 ML: at 09:15

## 2024-11-03 RX ADMIN — HYDROCHLOROTHIAZIDE 25 MG: 25 TABLET ORAL at 09:11

## 2024-11-03 RX ADMIN — INSULIN LISPRO 3 UNITS: 100 INJECTION, SOLUTION INTRAVENOUS; SUBCUTANEOUS at 09:42

## 2024-11-03 RX ADMIN — EMTRICITABINE AND TENOFOVIR ALAFENAMIDE 1 TABLET: 200; 25 TABLET ORAL at 03:05

## 2024-11-03 RX ADMIN — POTASSIUM CHLORIDE 40 MEQ: 750 TABLET, EXTENDED RELEASE ORAL at 03:13

## 2024-11-03 NOTE — PLAN OF CARE
"Goal Outcome Evaluation:  Plan of Care Reviewed With: patient         Pt admit from home after fall with dizziness. Glucose on admit 204. Long QT interval.  Pt PMH: DM with PN, HTN, HIV. Pt lives with roommate and reported amb without AD or cane prn. Pt reported [facility]. He reported as many as 6 falls in a day. Typically right lean and fall to the right. General strength BLE appears > 4/5. Mild quad atrophy noted. Hamstring tightness noted with PPT during knee ext in sitting. Pt moved from sup to sit labored but with supervision. STS with sba  no AD. He amb 200' with sba/cga. Shuffle pattern and unequal step length noted. Right foot passes left but left foot did not pass right. He performed sidestepping left/right, backward amb, 4 minisquats with sba/cga. No lob noted. In the last 50' pt appears to veer slightly right but again without direct lob. No AD used today. I had discussion with pt on controlling variables that he can control that contribute to falls. Maintain blood sugar, monitor and maintain hydration, monitor vital signs, upgrade to rwx if dizzy or once lob or unsteadiness in increased. Pt reported that he drinks a gallon of sweet tea per day and does like water. \" I got turned off to water in [place]... gallon of chlorine with a gallon of water. I recommended he try spring water over plain bottled water. He firmly decline rwx trial today. Recommend pt amb in hallway with [facility] staff due to fall risk. Recommend cane trial, test hamstring length formally to check if unequal as well as trendelenburg test to look for right glut weakness  next session. Pt will likely benefit from cont skilled PT to address weakness, impaired balance and fall risk.        Anticipated Discharge Disposition (PT): home (denies HHPT due to current OPPT)                        "

## 2024-11-03 NOTE — CASE MANAGEMENT/SOCIAL WORK
Discharge Planning Assessment  [facility]     Patient Name: Nakul Dutton  MRN: [id number]  Today's Date: 11/3/2024    Admit Date: 11/2/2024        Discharge Needs Assessment    No documentation.                  Discharge Plan       Row Name 11/03/24 1427       Plan    Plan Comments Request to arrange transportation for this patient back to his home. Pt reports he has keys to enter his home, stable ambulation. He does not have his phone, his roommate has his phone.  [place] voucher provided for the patient to return home  with Z trip. COnf # [id number]. Expected arrival time is 245 pm today at THE emergency ROom turnaround area. Pt is waiting in this area. Pt is AAOx4, no distress noted.  Cab voucher in his possession. FRANKY Varner RN [facility]    Final Discharge Disposition Code 01 - home or self-care                  Continued Care and Services - Discharged on 11/3/2024 Admission date: 11/2/2024 - Discharge disposition: Home or Self Care   No active coordination exists for this encounter.       Expected Discharge Date and Time       Expected Discharge Date Expected Discharge Time    Nov 3, 2024            Demographic Summary    No documentation.                  Functional Status    No documentation.                  Psychosocial    No documentation.                  Abuse/Neglect    No documentation.                  Legal    No documentation.                  Substance Abuse    No documentation.                  Patient Forms    No documentation.                     Alondra Varner RN

## 2024-11-03 NOTE — SIGNIFICANT NOTE
11/03/24 1141   OTHER   Discipline occupational therapist   Therapy Assessment/Plan (PT)   Criteria for Skilled Interventions Met (PT) no problems identified which require skilled intervention  (OT services not needed at this time,  PT addressing balance deficits and vertigo. OT to sign off, please reconsult as needed.)

## 2024-11-03 NOTE — DISCHARGE INSTRUCTIONS
Recommend use of the cane at all times.  It was noted that you had fibromuscular dysplasia of the right carotid artery.  Neurology recommends taking a aspirin 81 mg daily as a treatment.  Follow-up with neurology/Dr. Sg Biswas in 3 months time.  Follow-up with primary care for all other issues and interim management.  Continue with your [facility] rehab therapy.

## 2024-11-03 NOTE — DISCHARGE SUMMARY
ED OBSERVATION PROGRESS/DISCHARGE SUMMARY    Date of Admission: 11/2/2024   LOS: 0 days   PCP: Provider, No Known    Final Diagnosis sensory ataxia, insulin-dependent diabetes, dementia, hydrocephalus ex vacuo, fibromuscular dysplasia of right carotid artery      Subjective     Hospital Outcome: Discharge    Nakul Dutton is a 79-year-old male with past medical history of type 2 diabetes, hypertension, HIV who was admitted to the ED observation unit with chief complaint of a fall and being off balance.  Patient reports for greater than a year now he has had a tendency to lean to the right when he walks.  He denies any dizziness.  Today he was standing in the kitchen when he leaned to the left and fell.  He did not hit his head in the process.  Patient reports he has been unable to walk secondary to his disequilibrium over the past day.  No visual disturbance, trouble with speech, numbness, paresthesias, or weakness in extremities.  Neurology, PT and OT have been consulted.    [facility] currently for balance issues and PMH of subdural hematoma.    ROS:  General: no fevers, chills  Respiratory: no cough, dyspnea  Cardiovascular: no chest pain, palpitations  Abdomen: No abdominal pain, nausea, vomiting, or diarrhea  Neurologic: No focal weakness    11/3/2024.    7:23 AM.  MRI brain without contrast shows no acute process.    11:40 AM.  Patient evaluated by neurology.  Ataxia.  Secondary to peripheral neuropathy.  He has hydrocephalus but it looks like hydrocephalus ex vacuo he is not have any evidence of gait apraxia on exam.  Cane recommended for use with all activities.  FMD on the right carotid artery found on CTA is an incidental finding.  ASA recommended for this.  Patient cleared for discharge from a neurology standpoint.  He is to follow-up with Dr. Sg Biswas in 3 months time for the fibromuscular dysplasia.    Objective   Physical Exam:  I have reviewed  the vital signs.  Temp:  [97.4 °F (36.3 °C)-98.4 °F (36.9 °C)] 97.6 °F (36.4 °C)  Heart Rate:  [37-63] 63  Resp:  [16-18] 16  BP: (114-163)/(74-99) 154/82  General Appearance:    Alert, cooperative, no distress  Head:    Normocephalic, atraumatic  Eyes:    Sclerae anicteric  Neck:   Supple, no mass  Lungs: Clear to auscultation bilaterally, respirations unlabored  Heart: Regular rate and rhythm, S1 and S2 normal, no murmur, rub or gallop  Abdomen:  Soft, nontender, bowel sounds active, nondistended  Extremities: No clubbing, cyanosis, or edema to lower extremities  Pulses:  2+ and symmetric in distal lower extremities  Skin: No rashes   Neurologic: Oriented x3, Normal strength to extremities    Results Review:    I have reviewed the labs, radiology results and diagnostic studies.  There is ventriculomegaly present.  CTA neck shows 0.7 cm outpouching along the anterior medial aspect of the right internal carotid artery at the level of C2 this could represent fibromuscular dysplasia or fsaccular aneurysm..    Results from last 7 days   Lab Units 11/03/24  0321   WBC 10*3/mm3 8.05   HEMOGLOBIN g/dL 15.3   HEMATOCRIT % 43.8   PLATELETS 10*3/mm3 254     Results from last 7 days   Lab Units 11/03/24  0321 11/02/24  1731   SODIUM mmol/L 136 134*   POTASSIUM mmol/L 3.3* 3.6   CHLORIDE mmol/L 100 98   CO2 mmol/L 24.2 27.0   BUN mg/dL 8 9   CREATININE mg/dL 1.10 1.20   CALCIUM mg/dL 9.5 9.1   BILIRUBIN mg/dL  --  0.5   ALK PHOS U/L  --  86   ALT (SGPT) U/L  --  9   AST (SGOT) U/L  --  8   GLUCOSE mg/dL 204* 412*     Imaging Results (Last 24 Hours)       Procedure Component Value Units Date/Time    MRI Brain Without Contrast [[id number]] Collected: 11/03/24 0122     Updated: 11/03/24 0122    Narrative:        Patient: TRIPP TREVINO  Time Out: 01:21  Exam(s): MRI HEAD Without Contrast     EXAM:    MR Head Without Intravenous Contrast    CLINICAL HISTORY:     Reason for exam: Dizziness.    TECHNIQUE:    Magnetic resonance images  of the head brain without intravenous   contrast in multiple planes.    COMPARISON:  11 2 2024    FINDINGS:    Brain:  No mass.  No acute hemorrhage.  No restricted diffusion.    Chronic microvascular ischemic changes.    Ventricles: Ventriculomegaly again seen.  Age-related cerebral volume   loss.    Bones joints:  Unremarkable.    Sinuses:  No acute sinusitis.    Mastoid air cells:  No effusion.    Orbits:  Unremarkable.    IMPRESSION:         No acute infarct, hemorrhage  Ventriculomegaly again seen.  Correlate with normal pressure   hydrocephalus versus  ex vacuo dilatation.      Impression:          Electronically signed by Kelly Hines MD on 11-03-24 at 0121    CT Angiogram Head [[id number]] Collected: 11/02/24 1923     Updated: 11/02/24 1941    Narrative:      PROCEDURE: CT ANGIOGRAM HEAD-, CT ANGIOGRAM NECK-     HISTORY: Dizziness, unsteadiness.     TECHNIQUE: CT images of the brain were obtained without intravenous  contrast. Following the administration of intravenous contrast, CT  angiography images of the head and neck were obtained. Reformatted and  3-D images were reviewed. Radiation dose reduction techniques were  utilized, including automated exposure control and exposure modulation  based on body size.      COMPARISON:  None.     FINDINGS:     Non Contrast CT Brain:     There is moderate to advanced global brain volume loss.  No acute  intracranial hemorrhage or pathologic extra-axial collection is  identified.  There is no midline shift or mass effect.  The basal  cisterns are patent. There is mild small vessel ischemic disease. The  grey-white matter differentiation is maintained. There is calcific  intracranial atherosclerosis.  There is trace mucosal thickening of the right maxillary sinus and trace  fluid within the right mastoid air cells.     CT Angiography Head:     The right ophthalmic artery appears to arise from the cavernous segment  of the right ICA, a developmental variant. The  visible internal carotid  arteries, middle and anterior cerebral arteries demonstrate otherwise  normal contrast-related enhancement.  The anterior communicating artery  complex has a normal appearance. No sizable posterior communicating  arteries are identified on either side.     The left vertebral artery is dominant. The basilar artery is tortuous.  The vertebrobasilar circulation and both posterior cerebral arteries  demonstrate otherwise normal contrast-related enhancement. Bilateral  PICA origins are visualized.     No hemodynamically significant stenosis is seen.     CT Angiography Neck:     There is a 3 vessel aortic arch.  The origins of the vessels arising  from the arch are patent. The proximal common carotid arteries are  slightly tortuous. There is minimal calcific atherosclerosis at the left  carotid bulb. There is no significant carotid bulb narrowing by NASCET  criteria. There is a beaded appearance of the right internal carotid  artery. There is a more focal approximately 0.7 cm outpouching along the  anteromedial aspect of the right internal carotid artery at the level of  C2. There is only subtle contour abnormality of the left internal  carotid artery.     The vertebral arteries arise from the subclavian arteries.  The left  vertebral artery is dominant.  There is no evidence of flow-limiting  stenosis or occlusion of the vertebral arteries.     There are incompletely assessed thyroid nodules measuring up to 1.1 cm  on the left. There is degenerative disc disease.          Impression:         1.  No acute intracranial hemorrhage. Mild small vessel ischemic  disease. If there is clinical concern for acute infarction, this would  be better assessed with MRI.  2.  Asymmetric beaded appearance of the cervical right internal carotid  artery, including a more focal approximately 0.7 cm outpouching along  the anteromedial aspect at the level of C2. Only subtle contour  abnormality is identified in the  cervical left intracarotid artery.  Findings suggest possible fibromuscular dysplasia, although a  superimposed saccular aneurysm is difficult to exclude.     This report was finalized on 11/2/2024 7:38 PM by Dr. Yvonne Pop M.D  on Workstation: [id number]       CT Angiogram Neck [[id number]] Collected: 11/02/24 1923     Updated: 11/02/24 1941    Narrative:      PROCEDURE: CT ANGIOGRAM HEAD-, CT ANGIOGRAM NECK-     HISTORY: Dizziness, unsteadiness.     TECHNIQUE: CT images of the brain were obtained without intravenous  contrast. Following the administration of intravenous contrast, CT  angiography images of the head and neck were obtained. Reformatted and  3-D images were reviewed. Radiation dose reduction techniques were  utilized, including automated exposure control and exposure modulation  based on body size.      COMPARISON:  None.     FINDINGS:     Non Contrast CT Brain:     There is moderate to advanced global brain volume loss.  No acute  intracranial hemorrhage or pathologic extra-axial collection is  identified.  There is no midline shift or mass effect.  The basal  cisterns are patent. There is mild small vessel ischemic disease. The  grey-white matter differentiation is maintained. There is calcific  intracranial atherosclerosis.  There is trace mucosal thickening of the right maxillary sinus and trace  fluid within the right mastoid air cells.     CT Angiography Head:     The right ophthalmic artery appears to arise from the cavernous segment  of the right ICA, a developmental variant. The visible internal carotid  arteries, middle and anterior cerebral arteries demonstrate otherwise  normal contrast-related enhancement.  The anterior communicating artery  complex has a normal appearance. No sizable posterior communicating  arteries are identified on either side.     The left vertebral artery is dominant. The basilar artery is tortuous.  The vertebrobasilar circulation and both posterior cerebral  arteries  demonstrate otherwise normal contrast-related enhancement. Bilateral  PICA origins are visualized.     No hemodynamically significant stenosis is seen.     CT Angiography Neck:     There is a 3 vessel aortic arch.  The origins of the vessels arising  from the arch are patent. The proximal common carotid arteries are  slightly tortuous. There is minimal calcific atherosclerosis at the left  carotid bulb. There is no significant carotid bulb narrowing by NASCET  criteria. There is a beaded appearance of the right internal carotid  artery. There is a more focal approximately 0.7 cm outpouching along the  anteromedial aspect of the right internal carotid artery at the level of  C2. There is only subtle contour abnormality of the left internal  carotid artery.     The vertebral arteries arise from the subclavian arteries.  The left  vertebral artery is dominant.  There is no evidence of flow-limiting  stenosis or occlusion of the vertebral arteries.     There are incompletely assessed thyroid nodules measuring up to 1.1 cm  on the left. There is degenerative disc disease.          Impression:         1.  No acute intracranial hemorrhage. Mild small vessel ischemic  disease. If there is clinical concern for acute infarction, this would  be better assessed with MRI.  2.  Asymmetric beaded appearance of the cervical right internal carotid  artery, including a more focal approximately 0.7 cm outpouching along  the anteromedial aspect at the level of C2. Only subtle contour  abnormality is identified in the cervical left intracarotid artery.  Findings suggest possible fibromuscular dysplasia, although a  superimposed saccular aneurysm is difficult to exclude.     This report was finalized on 11/2/2024 7:38 PM by Dr. Yvonne Pop M.D  on Workstation: [id number]               I have reviewed the  medications.  [unfilled]  ---------------------------------------------------------------------------------------------  Assessment & Plan   Assessment/Problem List    Disequilibrium      Plan:    Vertigo/disequilibrium  -Neurochecks every 4 hours   -Vital signs every 4 hours   -Cardiac monitoring   -MRI brain shows no acute process.  -CT Head -no acute intracranial hemorrhage.  Mild small vessel ischemic disease.  -CTA Head/neck -asymmetric beaded appearance to the cervical right internal carotid artery, including a more focal approximately 0.7 Salemme Demeter outpouching along the anterior medial aspect at the level of C2.  Only subtle contour abnormality is identified in the cervical left internal carotid artery.  Findings suggest a possible fibromuscular dysplasia  -Neurosurgery has evaluated with the plan above.      Diabetes  -Continue home medications.     Hypertension  -Monitor blood pressure  -Continue home blood pressure agents     HIV  -Continue home dose Emtricitabine-Tenofovir AF      VTE Prophylaxis:  Mechanical VTE prophylaxis orders are present.    Disposition: Discharge    Follow-up after Discharge: Neurology, primary care, [facility]    This note will serve as a discharge summary    Steve Peacock III, PA 11/03/24 11:43 EST    I have worn appropriate PPE during this patient encounter, sanitized my hands both with entering and exiting patient's room.      30 minutes has been spent by [facility] providers in the care of this patient while under observation status

## 2024-11-03 NOTE — PLAN OF CARE
Goal Outcome Evaluation:         Pt. AOX4 and able to verbalized needs. IV removed. Discharged summary provided and education completed. Pt . Instructed to follow up with his PCP and Neurology. Pt picked up his medication at [facility] [place], operated by [facility]. Pt. Gathered all his belongings and he did not have any other question at the time of discharged. Pt. Left observation unit with a stefany transportation to home.

## 2024-11-03 NOTE — PROGRESS NOTES
"MD ATTESTATION NOTE    The KIERAN and I have discussed this patient's history, physical exam, and treatment plan.  I have reviewed the documentation and personally had a face to face interaction with the patient. I affirm the documentation and agree with the treatment and plan.  The attached note describes my personal findings.      I provided a substantive portion of the care of the patient.  I personally performed the physical exam in its entirety, and below are my findings.        Brief HPI: This patient is a 79-year-old male with a history of insulin-dependent diabetes mellitus admitted to the observation unit for worsening of a reported 2 years history of a \"off balance\" sensation.  He states that when he stood yesterday he fell to his right side which she has been doing so much more often lately.  He denies any injuries from the fall but is concerned about his worsening symptoms.  He currently denies headache, chest pain, shortness of breath, dizziness, lightheadedness, nausea/vomiting, or fever/chills.      PHYSICAL EXAM  ED Triage Vitals   Temp Heart Rate Resp BP SpO2   11/02/24 1713 11/02/24 1713 11/02/24 1713 11/02/24 1713 11/02/24 1713   97.4 °F (36.3 °C) 63 18 163/88 98 %      Temp src Heart Rate Source Patient Position BP Location FiO2 (%)   11/02/24 1931 11/02/24 1931 11/02/24 2058 11/02/24 2058 --   Oral Monitor Lying Right arm          GENERAL: Resting comfortably and in no acute distress, nontoxic in appearance  HENT: nares patent  EYES: no scleral icterus  CV: regular rhythm, normal rate, no M/R/G  RESPIRATORY: normal effort, lungs clear bilaterally  ABDOMEN: soft, nontender, no rebound or guarding  MUSCULOSKELETAL: no deformity, no edema  NEURO: alert, moves all extremities, follows commands  PSYCH:  calm, cooperative  SKIN: warm, dry    Vital signs and nursing notes reviewed.        Plan: CTA head and neck did reveal some fibromuscular dysplasia of the right coronary artery.  Neurology has seen the " patient this morning in consultation and states that he should be taking an 81 mg aspirin daily and they will follow very closely in their office as the symptoms could worsen over time.  Disposition planning to follow.

## 2024-11-03 NOTE — PLAN OF CARE
Goal Outcome Evaluation:         Pt admitted disequilibrium. Patient reports that he falls fairly frequently due to his vertigo. A+Ox4, however, I assisted him to the bathroom across the bradley and he stood there confused on where he was at, stating that he thought he was still in the ED. NSR to sinus genevieve RA, stand by assist. Patient ambulated well and did not need any assistance. Neurology consult in the morning.

## 2024-11-03 NOTE — CONSULTS
"Neurology Consult Note    Consult Date: 11/3/2024    Referring MD: Dr. Goff    Reason for Consult I have been asked to see the patient in neurological consultation to render advice and opinion regarding ataxia    Nakul Dutton is a 79 y.o. male with history of insulin-dependent diabetes, GERD, hypertension, sleep apnea who presented to the hospital complaining of worsening ataxia.  He complains of intermittent difficulty with balance for about 2 years.  Yesterday he was more off balance than normal and felt that he was drifting and falling to the right side.  He denied vertigo or double vision or unilateral weakness or numbness.  Today he feels a bit better and back to his baseline without complaint.    CTA revealed some fibromuscular dysplasia of the right carotid artery.  He endorses occasional retro-orbital eye pain but denies amaurosis.  He does not take aspirin at baseline.    Past Medical History:   Diagnosis Date    Diabetes mellitus     GERD (gastroesophageal reflux disease)     Hypertension     Sleep apnea        Exam  /83 (BP Location: Right arm, Patient Position: Lying)   Pulse 55   Temp 97.6 °F (36.4 °C) (Oral)   Resp 16   Ht 177.8 cm (70\")   Wt 75.9 kg (167 lb 6.4 oz)   SpO2 99%   BMI 24.02 kg/m²   Gen: NAD, vitals reviewed  MS: oriented x3, recent/remote memory intact, normal attention/concentration, language intact, no neglect.  CN: visual acuity grossly normal, visual fields full, PERRL, EOMI, no facial droop, no dysarthria  Motor: 5/5 throughout upper and lower extremities, normal tone  Coordination: No dysmetria  Sensory: Moderately diminished sensation to cold temperature and vibration bilateral lower extremities  Reflexes: Absent throughout  Gait: Wide-based, moderately ataxic, positive Romberg, negative pull test    DATA:    Lab Results   Component Value Date    GLUCOSE 204 (H) 11/03/2024    CALCIUM 9.5 11/03/2024     11/03/2024    K 3.3 (L) 11/03/2024    CO2 24.2 11/03/2024 "     11/03/2024    BUN 8 11/03/2024    CREATININE 1.10 11/03/2024    BCR 7.3 11/03/2024    ANIONGAP 11.8 11/03/2024     Lab Results   Component Value Date    WBC 8.05 11/03/2024    HGB 15.3 11/03/2024    HCT 43.8 11/03/2024    MCV 87.3 11/03/2024     11/03/2024       Lab review: CBC, BMP unremarkable    Imaging review: I personally reviewed his CTA head and neck and MRI of the brain performed during this observation stay.  CTA significant for fibromuscular dysplasia of the right cervical internal carotid.  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  He has hydrocephalus but it looks like hydrocephalus ex vacuo and he does not have evidence of gait apraxia on exam.  I recommended he use a cane for all activities.  FMD of the right carotid is an incidental finding.  I recommend he take aspirin for this.    PLAN:  Okay for discharge from a neurology standpoint  Follow-up with me in 3 months for fibromuscular dysplasia    Management discussed with Dr. Goff

## 2024-11-03 NOTE — THERAPY EVALUATION
Patient Name: Nakul Dutton  : 1945    MRN: [id number]                              Today's Date: 11/3/2024       Admit Date: 2024    Visit Dx:     ICD-10-CM ICD-9-CM   1. Vertigo  R42 780.4   2. Hyperglycemia due to diabetes mellitus  E11.65 250.02   3. Prolonged Q-T interval on ECG  R94.31 794.31     Patient Active Problem List   Diagnosis    Dizziness    Disequilibrium     Past Medical History:   Diagnosis Date    Diabetes mellitus     GERD (gastroesophageal reflux disease)     Hypertension     Sleep apnea      Past Surgical History:   Procedure Laterality Date    HERNIA REPAIR      TONSILLECTOMY        General Information       Row Name 24 1000          Physical Therapy Time and Intention    Document Type evaluation  -SV     Mode of Treatment individual therapy;physical therapy  -SV       Row Name 24 1000          General Information    Patient Profile Reviewed yes  -SV     Prior Level of Function independent:;all household mobility  repeated falls , as much as 6 falls in a day, uses stc prn, but declined rwx adamantly  -SV     Existing Precautions/Restrictions fall  -SV       Row Name 24 1000          Living Environment    People in Home friend(s)  pt has a roommate  -SV       Row Name 24 1000          Cognition    Orientation Status (Cognition) oriented x 3  -SV       Row Name 24 1000          Safety Issues/Impairments Affecting Functional Mobility    Safety Issues Affecting Function (Mobility) at risk behavior observed;awareness of need for assistance;insight into deficits/self-awareness;judgment  -SV     Impairments Affecting Function (Mobility) balance;endurance/activity tolerance;shortness of breath;strength  -SV               User Key  (r) = Recorded By, (t) = Taken By, (c) = Cosigned By      Initials Name Provider Type    SV Audrey Cheng, PT Physical Therapist                   Mobility       Row Name 24 1020          Bed Mobility    Bed Mobility  supine-sit  -SV     Supine-Sit Hydro (Bed Mobility) supervision  -       Row Name 11/03/24 1020          Sit-Stand Transfer    Sit-Stand Hydro (Transfers) standby assist  -     Comment, (Sit-Stand Transfer) no AD  -       Row Name 11/03/24 1044 11/03/24 1020       Gait/Stairs (Locomotion)    Hydro Level (Gait) -- standby assist  -SV    Distance in Feet (Gait) -- 200  -SV    Deviations/Abnormal Patterns (Gait) -- gait speed decreased;festinating/shuffling;tarun decreased;stride length decreased  slow and guarded , uneven step length, right foot passes left, left does not pass right  -SV    Bilateral Gait Deviations -- forward flexed posture;heel strike decreased  -    Comment, (Gait/Stairs) --  pt declined dizziness  -SV slight veer to the right in the last 50' but pt appeared to correct, no direc lob but does appear unsteady  -              User Key  (r) = Recorded By, (t) = Taken By, (c) = Cosigned By      Initials Name Provider Type    SV Audrey Cheng, PT Physical Therapist                   Obj/Interventions       Row Name 11/03/24 1022          Range of Motion Comprehensive    General Range of Motion no range of motion deficits identified  -     Comment, General Range of Motion bul hamstring tightness noted with PPT during knee ext, BRIANNE ankle coordination appears wfl for age  -       Row Name 11/03/24 1022          Strength Comprehensive (MMT)    Comment, General Manual Muscle Testing (MMT) Assessment MMT hip flex, abd/add in neutral sitting, knee ext and knee flex >4/5 , DF > 3/5 but not MMT today able to perform circles cw and ccw smooth, FTTO wfl also  -       Row Name 11/03/24 1022          Motor Skills    Therapeutic Exercise --  posterior leg stretch 1 reps 20 h educated on need for hamstring stretch  -       Row Name 11/03/24 1022          Balance    Balance Assessment sitting static balance;standing static balance;standing dynamic balance  -     Static  Sitting Balance modified independence  -SV     Static Standing Balance standby assist  -SV     Dynamic Standing Balance standby assist;contact guard  -SV     Comment, Balance performed sidestepping , left/right , backward amb all 7-10 ' with cga/sba no lob , minisquat x 4 no lob or unsteadiness noted  -SV       Row Name 11/03/24 1022          Sensory Assessment (Somatosensory)    Sensory Assessment (Somatosensory) not tested  Pt does report PN and so likely impaired BLE , hx of DM  -SV               User Key  (r) = Recorded By, (t) = Taken By, (c) = Cosigned By      Initials Name Provider Type    SV Audrey Cheng, PT Physical Therapist                   Goals/Plan       Row Name 11/03/24 1027          Transfer Goal 1 (PT)    Activity/Assistive Device (Transfer Goal 1, PT) sit-to-stand/stand-to-sit  -SV     [place] Level/Cues Needed (Transfer Goal 1, PT) independent  -SV     Time Frame (Transfer Goal 1, PT) 10 days  -SV       Row Name 11/03/24 1027          Gait Training Goal 1 (PT)    Activity/Assistive Device (Gait Training Goal 1, PT) gait (walking locomotion)  -SV     [place] Level (Gait Training Goal 1, PT) independent  -SV     Distance (Gait Training Goal 1, PT) 200' least vs no AD ,  -SV     Time Frame (Gait Training Goal 1, PT) 10 days  -SV       Row Name 11/03/24 1027          Balance Goal 1 (PT)    Activity/Assistive Device (Balance Goal) standing dynamic balance;unsupported  -SV     [place] Level/Cues Needed (Balance Goal 1, PT) independent  -SV     Time Frame (Balance Goal 1, PT) 2 weeks  -SV       Row Name 11/03/24 1027          Problem Specific Goal 1 (PT)    Problem Specific Goal 1 (PT) mia hamstring length functional to allow knee ext without PPT  -SV     Time Frame (Problem Specific Goal 1, PT) 2 weeks  -SV       Row Name 11/03/24 1027          Patient Education Goal (PT)    Activity (Patient Education Goal, PT) indep HEP  -SV     Time Frame (Patient Education Goal, PT) 10 days  -SV        Row Name 11/03/24 1027          Therapy Assessment/Plan (PT)    Planned Therapy Interventions (PT) balance training;gait training;home exercise program;patient/family education;stretching;strengthening;stair training;transfer training  -SV               User Key  (r) = Recorded By, (t) = Taken By, (c) = Cosigned By      Initials Name Provider Type    SV Audrey Cheng, PT Physical Therapist                   Clinical Impression       Row Name 11/03/24 1025          Pain    Pretreatment Pain Rating 0/10 - no pain  -SV     Posttreatment Pain Rating 0/10 - no pain  -SV       Row Name 11/03/24 1025          Plan of Care Review    Plan of Care Reviewed With patient  -SV       Row Name 11/03/24 1025          Therapy Assessment/Plan (PT)    Patient/Family Therapy Goals Statement (PT) indep amb no rwx use  -SV     Rehab Potential (PT) good  -SV     Criteria for Skilled Interventions Met (PT) yes  -SV     Therapy Frequency (PT) 6 times/wk  -SV     Predicted Duration of Therapy Intervention (PT) [facility] , KRISTINA transportation  -SV       Row Name 11/03/24 1025          Vital Signs    Pretreatment Heart Rate (beats/min) 68  -SV     Posttreatment Heart Rate (beats/min) 88  -SV     Pre SpO2 (%) 99  per chart  -SV     O2 Delivery Pre Treatment room air  -SV     O2 Delivery Intra Treatment room air  -SV     O2 Delivery Post Treatment room air  -SV     Pre Patient Position Supine  -SV     Intra Patient Position Standing  -SV     Post Patient Position Sitting  -SV       Row Name 11/03/24 1025          Positioning and Restraints    Pre-Treatment Position in bed  -SV     Post Treatment Position bed  -SV     In Bed sitting EOB;notified nsg;call light within reach;encouraged to call for assist  -SV               User Key  (r) = Recorded By, (t) = Taken By, (c) = Cosigned By      Initials Name Provider Type    SV Audrey Cheng, PT Physical Therapist                   Outcome Measures       Row Name 11/03/24 1029           How much help from another person do you currently need...    Turning from your back to your side while in flat bed without using bedrails? 4  -SV     Moving from lying on back to sitting on the side of a flat bed without bedrails? 4  -SV     Moving to and from a bed to a chair (including a wheelchair)? 4  -SV     Standing up from a chair using your arms (e.g., wheelchair, bedside chair)? 3  -SV     Climbing 3-5 steps with a railing? 3  -SV     To walk in hospital room? 3  -SV     AM-PAC 6 Clicks Score (PT) 21  -SV               User Key  (r) = Recorded By, (t) = Taken By, (c) = Cosigned By      Initials Name Provider Type    SV Audrey Cheng, PT Physical Therapist                                 Physical Therapy Education       Title: PT OT SLP Therapies (Done)       Topic: Physical Therapy (Done)       Point: Mobility training (Done)       Learning Progress Summary            Patient Acceptance, E,TB,D, VU,NR by  at 11/3/2024 1029                      Point: Home exercise program (Done)       Learning Progress Summary            Patient Acceptance, E,TB,D, VU,NR by  at 11/3/2024 1029                                      User Key       Initials Effective Dates Name Provider Type Discipline     07/11/23 -  Audrey Cheng, PT Physical Therapist PT                  PT Recommendation and Plan  Planned Therapy Interventions (PT): balance training, gait training, home exercise program, patient/family education, stretching, strengthening, stair training, transfer training        Time Calculation:         PT Charges       Row Name 11/03/24 1043             Time Calculation    Start Time 1000  -SV      Stop Time 1023  -SV      Time Calculation (min) 23 min  -SV      PT Received On 11/03/24  -SV      PT - Next Appointment 11/04/24  -SV      PT Goal Re-Cert Due Date 11/13/24  -SV                User Key  (r) = Recorded By, (t) = Taken By, (c) = Cosigned By      Initials Name Provider Type     Prosper  Audrey WAITE, PT Physical Therapist                  Therapy Charges for Today       Code Description Service Date Service Provider Modifiers Qty    54053817713 HC PT EVAL MOD COMPLEXITY 2 11/3/2024 Audrey Cheng, PT GP 1    [id number]  PT THERAPEUTIC ACT EA 15 MIN 11/3/2024 Audrey Cheng, PT GP 1            PT G-Codes  AM-PAC 6 Clicks Score (PT): 21  PT Discharge Summary  Anticipated Discharge Disposition (PT): home (denies HHPT due to current OPPT)    Audrey Cheng, PT  11/3/2024

## 2025-02-07 ENCOUNTER — TELEPHONE (OUTPATIENT)
Dept: NEUROLOGY | Facility: CLINIC | Age: 80
End: 2025-02-07
Payer: OTHER GOVERNMENT

## 2025-02-07 NOTE — TELEPHONE ENCOUNTER
Trying to verify insurance.  [facility] for [facility] since [facility] in November..Also, no evidence that the [facility] was informed of [facility] within 72 hours.  Pt scheduled in November for a hospital follow up.  [facility].  [facility] with Dr Benson since [facility] now have their own neurologist.  Left  for pt with the above information and a request for a return call if have other insurance he is planning on using.